# Patient Record
Sex: MALE | Race: WHITE | NOT HISPANIC OR LATINO | Employment: FULL TIME | URBAN - METROPOLITAN AREA
[De-identification: names, ages, dates, MRNs, and addresses within clinical notes are randomized per-mention and may not be internally consistent; named-entity substitution may affect disease eponyms.]

---

## 2017-01-01 PROBLEM — R73.9 BLOOD GLUCOSE ELEVATED: Status: ACTIVE | Noted: 2017-01-01

## 2017-11-07 ENCOUNTER — HOSPITAL ENCOUNTER (EMERGENCY)
Facility: HOSPITAL | Age: 62
Discharge: HOME | End: 2017-11-08
Attending: EMERGENCY MEDICINE
Payer: COMMERCIAL

## 2017-11-07 DIAGNOSIS — S39.92XA INJURY OF BACK DUE TO FALL, INITIAL ENCOUNTER: Primary | ICD-10-CM
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
## 2017-11-07 DIAGNOSIS — S22.32XA: ICD-10-CM

## 2017-11-08 ENCOUNTER — APPOINTMENT (EMERGENCY)
Dept: CT IMAGING | Facility: HOSPITAL | Age: 62
End: 2017-11-08
Payer: COMMERCIAL

## 2017-11-08 VITALS
SYSTOLIC BLOOD PRESSURE: 128 MMHG | OXYGEN SATURATION: 98 % | BODY MASS INDEX: 21.67 KG/M2 | TEMPERATURE: 97.5 F | WEIGHT: 160 LBS | RESPIRATION RATE: 16 BRPM | HEART RATE: 71 BPM | HEIGHT: 72 IN | DIASTOLIC BLOOD PRESSURE: 80 MMHG

## 2017-11-08 LAB
ANION GAP BLD CALC-SCNC: 16 MMOL/L (ref 4–13)
BILIRUB UR QL STRIP: NEGATIVE
BUN BLD-MCNC: 19 MG/DL (ref 5–25)
CA-I BLD-SCNC: 1.2 MMOL/L (ref 1.12–1.32)
CHLORIDE BLD-SCNC: 104 MMOL/L (ref 100–108)
CLARITY UR: CLEAR
COLOR UR: YELLOW
CREAT BLD-MCNC: 0.8 MG/DL (ref 0.6–1.3)
GFR SERPL CREATININE-BSD FRML MDRD: 97 ML/MIN/1.73SQ M
GLUCOSE SERPL-MCNC: 92 MG/DL (ref 65–140)
GLUCOSE UR STRIP-MCNC: NEGATIVE MG/DL
HCT VFR BLD CALC: 45 % (ref 36.5–49.3)
HGB BLDA-MCNC: 15.3 G/DL (ref 12–17)
HGB UR QL STRIP.AUTO: NEGATIVE
KETONES UR STRIP-MCNC: ABNORMAL MG/DL
LEUKOCYTE ESTERASE UR QL STRIP: NEGATIVE
NITRITE UR QL STRIP: NEGATIVE
PCO2 BLD: 25 MMOL/L (ref 21–32)
PH UR STRIP.AUTO: 5.5 [PH] (ref 4.5–8)
POTASSIUM BLD-SCNC: 4.5 MMOL/L (ref 3.5–5.3)
PROT UR STRIP-MCNC: NEGATIVE MG/DL
SODIUM BLD-SCNC: 140 MMOL/L (ref 136–145)
SP GR UR STRIP.AUTO: 1.02 (ref 1–1.03)
SPECIMEN SOURCE: ABNORMAL
UROBILINOGEN UR QL STRIP.AUTO: 0.2 E.U./DL

## 2017-11-08 PROCEDURE — 85014 HEMATOCRIT: CPT

## 2017-11-08 PROCEDURE — 74177 CT ABD & PELVIS W/CONTRAST: CPT

## 2017-11-08 PROCEDURE — 96374 THER/PROPH/DIAG INJ IV PUSH: CPT

## 2017-11-08 PROCEDURE — 96361 HYDRATE IV INFUSION ADD-ON: CPT

## 2017-11-08 PROCEDURE — 80047 BASIC METABLC PNL IONIZED CA: CPT

## 2017-11-08 PROCEDURE — 99284 EMERGENCY DEPT VISIT MOD MDM: CPT

## 2017-11-08 PROCEDURE — 81003 URINALYSIS AUTO W/O SCOPE: CPT | Performed by: EMERGENCY MEDICINE

## 2017-11-08 RX ORDER — NAPROXEN 500 MG/1
500 TABLET ORAL EVERY 12 HOURS PRN
Qty: 30 TABLET | Refills: 0 | Status: SHIPPED | OUTPATIENT
Start: 2017-11-08

## 2017-11-08 RX ORDER — ACETAMINOPHEN 325 MG/1
650 TABLET ORAL ONCE
Status: COMPLETED | OUTPATIENT
Start: 2017-11-08 | End: 2017-11-08

## 2017-11-08 RX ORDER — DIAZEPAM 5 MG/1
5 TABLET ORAL ONCE
Status: COMPLETED | OUTPATIENT
Start: 2017-11-08 | End: 2017-11-08

## 2017-11-08 RX ORDER — KETOROLAC TROMETHAMINE 30 MG/ML
15 INJECTION, SOLUTION INTRAMUSCULAR; INTRAVENOUS ONCE
Status: COMPLETED | OUTPATIENT
Start: 2017-11-08 | End: 2017-11-08

## 2017-11-08 RX ORDER — OXYCODONE HYDROCHLORIDE AND ACETAMINOPHEN 5; 325 MG/1; MG/1
TABLET ORAL
Qty: 20 TABLET | Refills: 0 | Status: SHIPPED | OUTPATIENT
Start: 2017-11-08

## 2017-11-08 RX ADMIN — ACETAMINOPHEN 650 MG: 325 TABLET ORAL at 00:17

## 2017-11-08 RX ADMIN — SODIUM CHLORIDE 500 ML: 0.9 INJECTION, SOLUTION INTRAVENOUS at 00:11

## 2017-11-08 RX ADMIN — KETOROLAC TROMETHAMINE 15 MG: 30 INJECTION, SOLUTION INTRAMUSCULAR at 01:10

## 2017-11-08 RX ADMIN — IOHEXOL 100 ML: 350 INJECTION, SOLUTION INTRAVENOUS at 01:07

## 2017-11-08 RX ADMIN — DIAZEPAM 5 MG: 5 TABLET ORAL at 00:17

## 2017-11-08 NOTE — DISCHARGE INSTRUCTIONS
Rib Fracture   WHAT YOU NEED TO KNOW:   A rib fracture is a crack or break in a rib bone  Rib fractures usually heal within 6 weeks  You should be able to return to normal activities before that time  Do not wrap anything around your body to try to splint your ribs  This can prevent you from taking deep breaths and increases your risk for pneumonia  DISCHARGE INSTRUCTIONS:   Call 911 for any of the following:   · You have trouble breathing  · You have new or increased pain  Seek care immediately if:   · Your pain does not get better, even after treatment  · You have a fever or a cough  Contact your healthcare provider if:   · You have questions or concerns about your condition or care  Medicines:   · NSAIDs  help decrease swelling and pain  NSAIDs are available without a doctor's order  Ask your healthcare provider which medicine is right for you  Ask how much to take and when to take it  Take as directed  NSAIDs can cause stomach bleeding and kidney problems if not taken correctly  · Prescription pain medicine  may be given  Ask your healthcare provider how to take this medicine safely  Some prescription pain medicines contain acetaminophen  Do not take other medicines that contain acetaminophen without talking to your healthcare provider  Too much acetaminophen may cause liver damage  Prescription pain medicine may cause constipation  Ask your healthcare provider how to prevent or treat constipation  · Take your medicine as directed  Contact your healthcare provider if you think your medicine is not helping or if you have side effects  Tell him or her if you are allergic to any medicine  Keep a list of the medicines, vitamins, and herbs you take  Include the amounts, and when and why you take them  Bring the list or the pill bottles to follow-up visits  Carry your medicine list with you in case of an emergency    Follow up with your healthcare provider as directed:  Write down your questions so you remember to ask them during your visits  Deep breathing:  Deep breathing will decrease your risk for pneumonia  Hug a pillow on the injured side while doing this exercise, to decrease pain  Take a deep breath and hold it for as long as possible  You should let the air out and then cough strongly  Deep breaths help open your airway  You may be given an incentive spirometer to help take deep breaths  Put the plastic piece in your mouth  Take a slow, deep breath  You should then let the air out and cough  Repeat these steps 10 times every hour  Rest:  Rest and limit activity to decrease swelling and pain, and allow your injury to heal  Avoid activities that may cause more pain or damage to your ribs such as, pulling, pushing, and lifting  As your pain decreases, begin movements slowly  Take short walks between rest periods  Ice:  Apply ice on the fractured area for 15 to 20 minutes every hour or as directed  Use an ice pack or put crushed ice in a plastic bag  Cover it with a towel  Ice helps prevent tissue damage and decreases swelling and pain  © 2017 [facility] Information is for End User's use only and may not be sold, redistributed or otherwise used for commercial purposes  All illustrations and images included in CareNotes® are the copyrighted property of A D A M , Inc  or [employer]  The above information is an  only  It is not intended as medical advice for individual conditions or treatments  Talk to your doctor, nurse or pharmacist before following any medical regimen to see if it is safe and effective for you

## 2017-11-08 NOTE — ED PROVIDER NOTES
History  Chief Complaint   Patient presents with   Floydene Nima     states "was cutting wood and fell back onto a log   pain in left lower back" Denies hitting head or LOC     Patient is a 72-year-old male with past medical history of hepatitis-C, presents to the emergency department complaining of left flank/mid back pain after he fell backwards while cutting wood  Patient states he was cutting wood on Friday and accidentally fell backwards and landed on another log  He states he struck his left side of the back/flank region  He denies hitting his head or loss of consciousness  Over the weekend he had minimal pain in that area however yesterday he coughed and started having much more severe pain in left flank region  He reports it is worse when he takes a deep breath, cough sneezes  Certain movements also exacerbate the pain  He denies any radiation of pain and he has yet to take any over-the-counter medications for pain  He denies any other symptoms or injuries  No fever, chills, headache, dizziness and near syncope, change in vision or hearing, tinnitus, neck pain or stiffness, URI symptoms, cough or hemoptysis, chest pain, palpitations, dyspnea, wheezing, abdominal pain or distention, nausea, vomiting, diarrhea, constipation, blood per rectum or melena, dysuria, frequency, hematuria, urinary or fecal retention or incontinence, skin rash or color change, extremity weakness or paresthesia or other focal neurologic deficits  Patient denies being on any blood thinners  History provided by:  Patient   used: No        Prior to Admission Medications   Prescriptions Last Dose Informant Patient Reported?  Taking?   predniSONE 10 mg tablet   No No   Sig: Take 3 tablets by mouth with meal tomorrow (1/2/17), 2 tablets by mouth with meal 1/3/17, and 1 tablet by mouth with meal 1/4/17      Facility-Administered Medications: None       Past Medical History:   Diagnosis Date    Hepatitis C No past surgical history on file  No family history on file  I have reviewed and agree with the history as documented  Social History   Substance Use Topics    Smoking status: Former Smoker     Packs/day: 1 00     Years: 30 00     Quit date: 12/3/2016    Smokeless tobacco: Former User     Quit date: 12/10/2016    Alcohol use 1 2 oz/week     2 Cans of beer per week        Review of Systems   Constitutional: Negative for chills and fever  HENT: Negative for congestion, ear pain, hearing loss, rhinorrhea, sore throat and tinnitus  Eyes: Negative for photophobia, pain and visual disturbance  Respiratory: Negative for cough, chest tightness, shortness of breath and wheezing  Cardiovascular: Negative for chest pain and palpitations  Gastrointestinal: Negative for abdominal pain, blood in stool, constipation, diarrhea, nausea and vomiting  Genitourinary: Positive for flank pain  Negative for decreased urine volume, difficulty urinating, dysuria, frequency and hematuria  Musculoskeletal: Positive for back pain  Negative for gait problem, neck pain and neck stiffness  Skin: Negative for color change, pallor, rash and wound  Allergic/Immunologic: Negative for immunocompromised state  Neurological: Negative for dizziness, syncope, weakness, light-headedness, numbness and headaches  Psychiatric/Behavioral: Negative for confusion and decreased concentration  All other systems reviewed and are negative        Physical Exam  ED Triage Vitals [11/07/17 2335]   Temperature Pulse Respirations Blood Pressure SpO2   97 5 °F (36 4 °C) 66 18 120/77 96 %      Temp Source Heart Rate Source Patient Position - Orthostatic VS BP Location FiO2 (%)   Oral Monitor Sitting Right arm --      Pain Score       9         Vitals:    11/07/17 2335 11/07/17 2336 11/08/17 0142   BP: 120/77  128/80   Pulse: 66  71   Resp: 18  16   Temp: 97 5 °F (36 4 °C)     TempSrc: Oral     SpO2: 96% 96% 98%   Weight: 72 6 kg (160 lb)     Height: 6' (1 829 m)           Physical Exam   Constitutional: He is oriented to person, place, and time  He appears well-developed and well-nourished  No distress  HENT:   Head: Normocephalic and atraumatic  Right Ear: External ear normal    Left Ear: External ear normal    Nose: Nose normal    Mouth/Throat: Oropharynx is clear and moist    Eyes: Conjunctivae and EOM are normal  Pupils are equal, round, and reactive to light  Neck: Normal range of motion  Neck supple  No JVD present  Cardiovascular: Normal rate, regular rhythm, normal heart sounds and intact distal pulses  Exam reveals no gallop and no friction rub  No murmur heard  Pulmonary/Chest: Effort normal and breath sounds normal  No respiratory distress  He has no wheezes  He has no rales  He exhibits no tenderness  Abdominal: Soft  Bowel sounds are normal  He exhibits no distension  There is no tenderness  There is no rebound and no guarding  Musculoskeletal: Normal range of motion  He exhibits tenderness  He exhibits no edema  No midline C/T/L-spine tenderness  No step-offs  No right-sided back tenderness  The left flank and left lower posterior ribs are tender to palpation  There is tenderness in the left thoracolumbar paravertebral muscle region  Lymphadenopathy:     He has no cervical adenopathy  Neurological: He is alert and oriented to person, place, and time  No cranial nerve deficit  No gross motor or sensory deficits  5/5 strength throughout  Skin: Skin is warm and dry  No rash noted  He is not diaphoretic  No erythema  No pallor  Psychiatric: He has a normal mood and affect  His behavior is normal    Nursing note and vitals reviewed        ED Medications  Medications   sodium chloride 0 9 % bolus 500 mL (0 mL Intravenous Stopped 11/8/17 0138)   acetaminophen (TYLENOL) tablet 650 mg (650 mg Oral Given 11/8/17 0017)   diazepam (VALIUM) tablet 5 mg (5 mg Oral Given 11/8/17 0017)   ketorolac (TORADOL) 30 mg/mL injection 15 mg (15 mg Intravenous Given 11/8/17 0110)   iohexol (OMNIPAQUE) 350 MG/ML injection (SINGLE-DOSE) 100 mL (100 mL Intravenous Given 11/8/17 0107)       Diagnostic Studies  Results Reviewed     Procedure Component Value Units Date/Time    UA w Reflex to Microscopic [[id number]]  (Abnormal) Collected:  11/08/17 0109    Lab Status:  Final result Specimen:  Urine from Urine, Clean Catch Updated:  11/08/17 0114     Color, UA Yellow     Clarity, UA Clear     Specific Gravity, UA 1 025     pH, UA 5 5     Leukocytes, UA Negative     Nitrite, UA Negative     Protein, UA Negative mg/dl      Glucose, UA Negative mg/dl      Ketones, UA Trace (A) mg/dl      Urobilinogen, UA 0 2 E U /dl      Bilirubin, UA Negative     Blood, UA Negative    POCT Chem 8+ [[id number]]  (Abnormal) Collected:  11/08/17 0023    Lab Status:  Final result Updated:  11/08/17 0028     SODIUM, I-STAT 140 mmol/l      Potassium, i-STAT 4 5 mmol/L      Chloride, istat 104 mmol/L      CO2, i-STAT 25 mmol/L      Anion Gap, Istat 16 (H) mmol/L      Calcium, Ionized i-STAT 1 20 mmol/L      BUN, I-STAT 19 mg/dl      Creatinine, i-STAT 0 8 mg/dl      eGFR 97 ml/min/1 73sq m      Glucose, i-STAT 92 mg/dl      Hct, i-STAT 45 %      Hgb, i-STAT 15 3 g/dl      Specimen Type VENOUS                 CT abdomen pelvis with contrast   ED Interpretation by Preet Mejia DO (11/08 0133)   [facility] report: "IMPRESSION:   Positive for a minimally displaced acute fracture of the posterior left 11th rib  CT recon only lumbar spine   ED Interpretation by Preet Mejia DO (11/08 0132)   [facility] report: IMPRESSION:   Normal lumbar spine CT  Procedures  Procedures       Phone Contacts  ED Phone Contact    ED Course  ED Course as of Nov 08 0223   Wed Nov 08, 2017 0106 [facility] report of CT abdomen/pelvis" "IMPRESSION:  Positive for a minimally displaced acute fracture of the posterior left 11th rib "  Updated patient about these findings    Will send home with pain medication as well as incentive spirometry  Recommended he follow up with his family doctor and return if his symptoms worsen, if he develops shortness of breath, productive cough or hemoptysis, vomiting, blood in urine or any other worrisome symptoms  MDM  Number of Diagnoses or Management Options  Closed traumatic minimally displaced fracture of one rib of left side, initial encounter:   Injury of back due to fall, initial encounter:   Diagnosis management comments: Left-sided back/flank pain after fall directly onto a log  Differential includes muscle contusion or strain/spasm, rib or vertebral fracture, kidney contusion or laceration  Will obtain a CT scan of the abdomen pelvis with lumbar spine recons  Will check urinalysis to ensure there is no microscopic hematuria as well as Chem i-STAT  Will give small IV fluid bolus, Tylenol and Valium  As long as CT scan does not show any intra-abdominal bleeding, will add Toradol for further pain relief  Amount and/or Complexity of Data Reviewed  Clinical lab tests: ordered and reviewed  Tests in the radiology section of CPT®: ordered and reviewed  Independent visualization of images, tracings, or specimens: yes    Patient Progress  Patient progress: improved (Patient reassessed at time of CT scan being read and states his pain is improved )    CritCare Time    Disposition  Final diagnoses:   Injury of back due to fall, initial encounter   Closed traumatic minimally displaced fracture of one rib of left side, initial encounter     Time reflects when diagnosis was documented in both MDM as applicable and the Disposition within this note     Time User Action Codes Description Comment    11/8/2017  1:34 AM David Guerrero Add [L27 33PE,  W19  XXXA] Injury of back due to fall, initial encounter     11/8/2017  1:35 AM Sobeida Lama [S22 32XA] Closed traumatic minimally displaced fracture of one rib of left side, initial encounter       ED Disposition     ED Disposition Condition Comment    Discharge  Ninfa Code discharge to home/self care  Condition at discharge: Stable        Follow-up Information     Follow up With Specialties Details Why Contact Info Additional Information    Rock Lee DO Family Medicine Schedule an appointment as soon as possible for a visit  [id number]  [address] [place] [address]  [phone]       [facility] Emergency Medicine Go to If symptoms worsen [address] [facility] [address] [phone] [place] [facility], [address] [place], [place], [address]        Discharge Medication List as of 11/8/2017  1:36 AM      START taking these medications    Details   naproxen (NAPROSYN) 500 mg tablet Take 1 tablet by mouth every 12 (twelve) hours as needed for mild pain or moderate pain, Starting Wed 11/8/2017, Print      oxyCODONE-acetaminophen (PERCOCET) 5-325 mg per tablet 1-2 tablets every 4-6 hours as needed for severe pain, Print         CONTINUE these medications which have NOT CHANGED    Details   predniSONE 10 mg tablet Take 3 tablets by mouth with meal tomorrow (1/2/17), 2 tablets by mouth with meal 1/3/17, and 1 tablet by mouth with meal 1/4/17, Print           No discharge procedures on file      ED Provider  Electronically Signed by           Krystyna Winslow DO  11/08/17 5326

## 2017-11-10 ENCOUNTER — HOSPITAL ENCOUNTER (EMERGENCY)
Facility: HOSPITAL | Age: 62
Discharge: HOME | End: 2017-11-10
Attending: EMERGENCY MEDICINE
Payer: COMMERCIAL

## 2017-11-10 VITALS
WEIGHT: 165 LBS | RESPIRATION RATE: 16 BRPM | BODY MASS INDEX: 22.35 KG/M2 | HEIGHT: 72 IN | DIASTOLIC BLOOD PRESSURE: 93 MMHG | TEMPERATURE: 97.6 F | HEART RATE: 74 BPM | OXYGEN SATURATION: 99 % | SYSTOLIC BLOOD PRESSURE: 151 MMHG

## 2017-11-10 DIAGNOSIS — S36.039A LACERATION OF SPLEEN, INITIAL ENCOUNTER: Primary | ICD-10-CM

## 2017-11-10 LAB
ABO GROUP BLD: NORMAL
ALBUMIN SERPL BCP-MCNC: 3.9 G/DL (ref 3.5–5)
ALP SERPL-CCNC: 81 U/L (ref 46–116)
ALT SERPL W P-5'-P-CCNC: 32 U/L (ref 12–78)
ANION GAP SERPL CALCULATED.3IONS-SCNC: 5 MMOL/L (ref 4–13)
APTT PPP: 28 SECONDS (ref 23–35)
AST SERPL W P-5'-P-CCNC: 24 U/L (ref 5–45)
BASOPHILS # BLD AUTO: 0.04 THOUSANDS/ΜL (ref 0–0.1)
BASOPHILS NFR BLD AUTO: 0 % (ref 0–1)
BILIRUB SERPL-MCNC: 0.97 MG/DL (ref 0.2–1)
BLD GP AB SCN SERPL QL: NEGATIVE
BUN SERPL-MCNC: 14 MG/DL (ref 5–25)
CALCIUM SERPL-MCNC: 8.9 MG/DL (ref 8.3–10.1)
CHLORIDE SERPL-SCNC: 104 MMOL/L (ref 100–108)
CO2 SERPL-SCNC: 28 MMOL/L (ref 21–32)
CREAT SERPL-MCNC: 0.68 MG/DL (ref 0.6–1.3)
EOSINOPHIL # BLD AUTO: 0.33 THOUSAND/ΜL (ref 0–0.61)
EOSINOPHIL NFR BLD AUTO: 3 % (ref 0–6)
ERYTHROCYTE [DISTWIDTH] IN BLOOD BY AUTOMATED COUNT: 13.7 % (ref 11.6–15.1)
GFR SERPL CREATININE-BSD FRML MDRD: 104 ML/MIN/1.73SQ M
GLUCOSE SERPL-MCNC: 85 MG/DL (ref 65–140)
HCT VFR BLD AUTO: 45.1 % (ref 36.5–49.3)
HGB BLD-MCNC: 15.8 G/DL (ref 12–17)
INR PPP: 0.96 (ref 0.86–1.16)
LYMPHOCYTES # BLD AUTO: 2.17 THOUSANDS/ΜL (ref 0.6–4.47)
LYMPHOCYTES NFR BLD AUTO: 17 % (ref 14–44)
MCH RBC QN AUTO: 33.5 PG (ref 26.8–34.3)
MCHC RBC AUTO-ENTMCNC: 35 G/DL (ref 31.4–37.4)
MCV RBC AUTO: 96 FL (ref 82–98)
MONOCYTES # BLD AUTO: 1.11 THOUSAND/ΜL (ref 0.17–1.22)
MONOCYTES NFR BLD AUTO: 9 % (ref 4–12)
NEUTROPHILS # BLD AUTO: 9.06 THOUSANDS/ΜL (ref 1.85–7.62)
NEUTS SEG NFR BLD AUTO: 71 % (ref 43–75)
NRBC BLD AUTO-RTO: 0 /100 WBCS
PLATELET # BLD AUTO: 236 THOUSANDS/UL (ref 149–390)
PMV BLD AUTO: 11.8 FL (ref 8.9–12.7)
POTASSIUM SERPL-SCNC: 3.9 MMOL/L (ref 3.5–5.3)
PROT SERPL-MCNC: 7.4 G/DL (ref 6.4–8.2)
PROTHROMBIN TIME: 12.8 SECONDS (ref 12.1–14.4)
RBC # BLD AUTO: 4.72 MILLION/UL (ref 3.88–5.62)
RH BLD: POSITIVE
SODIUM SERPL-SCNC: 137 MMOL/L (ref 136–145)
SPECIMEN EXPIRATION DATE: NORMAL
WBC # BLD AUTO: 12.76 THOUSAND/UL (ref 4.31–10.16)

## 2017-11-10 PROCEDURE — 85730 THROMBOPLASTIN TIME PARTIAL: CPT | Performed by: EMERGENCY MEDICINE

## 2017-11-10 PROCEDURE — 86901 BLOOD TYPING SEROLOGIC RH(D): CPT | Performed by: EMERGENCY MEDICINE

## 2017-11-10 PROCEDURE — 85610 PROTHROMBIN TIME: CPT | Performed by: EMERGENCY MEDICINE

## 2017-11-10 PROCEDURE — 36415 COLL VENOUS BLD VENIPUNCTURE: CPT | Performed by: EMERGENCY MEDICINE

## 2017-11-10 PROCEDURE — 86850 RBC ANTIBODY SCREEN: CPT | Performed by: EMERGENCY MEDICINE

## 2017-11-10 PROCEDURE — 99284 EMERGENCY DEPT VISIT MOD MDM: CPT

## 2017-11-10 PROCEDURE — 86900 BLOOD TYPING SEROLOGIC ABO: CPT | Performed by: EMERGENCY MEDICINE

## 2017-11-10 PROCEDURE — 80053 COMPREHEN METABOLIC PANEL: CPT | Performed by: EMERGENCY MEDICINE

## 2017-11-10 PROCEDURE — 85025 COMPLETE CBC W/AUTO DIFF WBC: CPT | Performed by: EMERGENCY MEDICINE

## 2017-11-10 NOTE — ED NOTES
Trauma at bedside     Pavithra Alfaro, [address] [facility] [place] [facility]  11/10/17 6992

## 2017-11-10 NOTE — ED NOTES
11/10/2017 1208 patient's wife returned phone messages left for him  I informed her of the CT scan findings of krys splenic hemorrhage grade 1 or 2 noted on the over read of the CT scan of the abdomen and pelvis  BARBARA bynum pa-c had discussed case with trauma service Dr Anna Conner after receiving phone call regarding abnormal ct scan results and had been instructed to inform patient to return to ed for further evaluation due to symptoms secondary to trauma  I informed patient's wife to have him return to [facility] ED for evaluation of perisplenic hemorrhage  She verbalized understanding of instructions  Called to inform [facility] ED regarding possible patient incoming        Mariel Vee PA-C  11/10/17 2028

## 2017-11-10 NOTE — H&P
H&P Exam - Trauma   Roosevelt Perez 63 y o  male MRN: [id number]  Unit/Bed#: ED 21 Encounter: [id number]    Assessment/Plan   Trauma Alert: Other ED consult  Model of Arrival: Self  Trauma Team: Attending Florencio Medeiros  Resident: Corinne Meier  Consultants: None    Trauma Active Problems:   Grade 1 or 2 splenic laceration with perisplenic hemorrhage   Nondisplaced left 11th rib fracture      Trauma Plan:   - patient stable to be discharged home  - hgb stable went from 13>15  - advised to return if any worsening of symptoms     Chief Complaint: I feel great     History of Present Illness   HPI:  Roosevelt Perez is a 63 y o  male who presented as as trauma consult regarding a missed CT read regarding splenic laceration  Patient states he was splitting wood about 1 week ago when he fell backward onto a log  He had no pain at the time and slowly developed  He went to the ED 11/8 and was evaluated with CT abdomen and pelvis with was read by [facility] with only 1 rib fracture  The morning radiologist called regarding missed grade 1 or 2 with splenic laceration  Patient was called to return to the ED and came in today (2days later)  Currently patient states his pain is completely gone and he is back at work  He is not taking any medications for pain  States no chest pain, shortness of breath, abdominal pain, weakness, numbness, back pain  He would like to go home  Repeat labs showed improved hgb to 15 8 and normal coags  Patient not on blood thinners     Mechanism:Fall    Review of Systems  REVIEW OF SYSTEMS  Constitutional:  Denies fever or chills   Eyes:  Denies change in visual acuity   HENT:  Denies nasal congestion or sore throat   Respiratory:  Denies cough or shortness of breath   Cardiovascular:  Denies chest pain or edema   GI:  Denies abdominal pain, nausea, vomiting, bloody stools or diarrhea   :  Denies dysuria   Musculoskeletal:  Denies back pain or joint pain   Integument:  Denies rash   Neurologic:  Denies headache, focal weakness or sensory changes   Endocrine:  Denies polyuria or polydipsia   Lymphatic:  Denies swollen glands   Psychiatric:  Denies depression or anxiety     Historical Information     Past Medical History:   Diagnosis Date    Hepatitis C      History reviewed  No pertinent surgical history  Social History   History   Alcohol Use    1 2 oz/week    2 Cans of beer per week     History   Drug Use No     History   Smoking Status    Former Smoker    Packs/day: 1 00    Years: 30 00    Quit date: 12/3/2016   Smokeless Tobacco    Former User    Quit date: 12/10/2016     Immunization History   Administered Date(s) Administered    Influenza, Quadrivalent 01/01/2017     Last Tetanus: unsure  Family History: Non-contributory        Meds/Allergies   all current active meds have been reviewed    No Known Allergies      PHYSICAL EXAM  Constitutional:  Well developed, well nourished, no distress   distress, non-toxic appearance   Eyes:  PERRL, conjunctiva normal   HENT:  Atraumatic, external ears normal, nose normal, oropharynx moist, no pharyngeal exudates  Neck- normal range of motion, no tenderness, supple   Respiratory: no wheezes, no rhonchi and normal symmetric air entry normal breath sounds bilaterally,   Cardiovascular:  normal rate, normal rhythm, no murmurs, no gallops, no rubs   GI:  nontender, no rebound or guarding  , no organomegaly, no mass,    Musculoskeletal:  no CVA tenderness, no tenderness, no deformities   Back- no tenderness  Integument:  Well hydrated, no rash   Lymphatic:  No lymphadenopathy noted   Neurologic:  Alert & oriented x 3, CN 2-12 normal, normal motor function, normal sensory function, no focal deficits noted   Psychiatric:  Speech and behavior appropriate       Objective   Vitals:   First set: Temperature: 97 6 °F (36 4 °C) (11/10/17 1404)  Pulse: 86 (11/10/17 1404)  Respirations: 16 (11/10/17 1404)  Blood Pressure: 145/86 (11/10/17 1404)    Primary Survey:   (A) Airway: intact  (B) Breathing: cta b/l  (C) Circulation: Pulses:   normal  (D) Disabliity:  GCS Total:  15  (E) Expose:  Completed    Secondary Survey: (Click on Physical Exam tab above)  Physical Exam    Invasive Devices     Peripheral Intravenous Line            Peripheral IV 11/10/17 Left Antecubital less than 1 day                Lab Results:   BMP/CMP:   Lab Results   Component Value Date     11/10/2017    K 3 9 11/10/2017     11/10/2017    CO2 28 11/10/2017    ANIONGAP 5 11/10/2017    BUN 14 11/10/2017    CREATININE 0 68 11/10/2017    GLUCOSE 85 11/10/2017    CALCIUM 8 9 11/10/2017    AST 24 11/10/2017    ALT 32 11/10/2017    ALKPHOS 81 11/10/2017    PROT 7 4 11/10/2017    ALBUMIN 3 9 11/10/2017    BILITOT 0 97 11/10/2017    EGFR 104 11/10/2017    and CBC:   Lab Results   Component Value Date    WBC 12 76 (H) 11/10/2017    HGB 15 8 11/10/2017    HCT 45 1 11/10/2017    MCV 96 11/10/2017     11/10/2017    MCH 33 5 11/10/2017    MCHC 35 0 11/10/2017    RDW 13 7 11/10/2017    MPV 11 8 11/10/2017    NRBC 0 11/10/2017     Imaging/EKG Studies:   Other Studies: 11/8 CT AP:    Splenic lower pole injury likely grade 1 or grade 2 with minimal perisplenic hemorrhage maximum thickness 0 6 cm      Acute nondisplaced fracture left 11th posterolateral rib      There is a significant additional finding or different interpretation from the [facility] preliminary report which was provided shortly after completion of the exam  The finding of grade 1 or grade 2 injury lower pole of the spleen with minimal   pericapsular hemorrhage  may alter immediate patient management, therefore we will now contact referring physicians for direct verbal discussion        Code Status: Prior  Advance Directive and Living Will:      Power of :    POLST:

## 2017-11-10 NOTE — ED ATTENDING ATTESTATION
Montana Humphreys DO, saw and evaluated the patient  I have discussed the patient with the resident/non-physician practitioner and agree with the resident's/non-physician practitioner's findings, Plan of Care, and MDM as documented in the resident's/non-physician practitioner's note, except where noted  All available labs and Radiology studies were reviewed  At this point I agree with the current assessment done in the Emergency Department  I have conducted an independent evaluation of this patient including a focused history and a physical exam     ED Note - Donna Santiago 63 y o  male MRN: [id number]  Unit/Bed#: ED 21 Encounter: [id number]    History of Present Illness   HPI  Donna Santiago is a 63 y o  male who presents for evaluation after call back  s/p fall  He was "splitting wood" on 11/8/17 and fell backwards onto his left back  ED evaluation at [facility] completed and CT showed left 11th rib fx  The addendum to the initial interpretation showed "The finding of grade 1 or grade 2 injury lower pole of the spleen with minimal pericapsular hemorrhage"  Patient was notified by hospital staff and was refered to the [facility] for evaluation by the trauma service  Currently patient has no acute complaints  REVIEW OF SYSTEMS  See HPI for further details  12 systems reviewed and otherwise negative except as noted  Historical Information     PAST MEDICAL HISTORY  Past Medical History:   Diagnosis Date    Hepatitis C        FAMILY HISTORY  History reviewed  No pertinent family history      SOCIAL HISTORY  Social History     Social History    Marital status: /Civil Union     Spouse name: N/A    Number of children: N/A    Years of education: N/A     Social History Main Topics    Smoking status: Former Smoker     Packs/day: 1 00     Years: 30 00     Quit date: 12/3/2016    Smokeless tobacco: Former User     Quit date: 12/10/2016    Alcohol use 1 2 oz/week     2 Cans of beer per week    Drug use: No    Sexual activity: Not Asked     Other Topics Concern    None     Social History Narrative    None       SURGICAL HISTORY  History reviewed  No pertinent surgical history  Meds/Allergies     CURRENT MEDICATIONS  No current facility-administered medications for this encounter  Current Outpatient Prescriptions:     naproxen (NAPROSYN) 500 mg tablet, Take 1 tablet by mouth every 12 (twelve) hours as needed for mild pain or moderate pain, Disp: 30 tablet, Rfl: 0    oxyCODONE-acetaminophen (PERCOCET) 5-325 mg per tablet, 1-2 tablets every 4-6 hours as needed for severe pain, Disp: 20 tablet, Rfl: 0    (Not in a hospital admission)    ALLERGIES  No Known Allergies  Objective     PHYSICAL EXAM    VITAL SIGNS: Blood pressure 118/72, pulse 75, temperature 97 6 °F (36 4 °C), temperature source Tympanic, resp  rate 18, height 6' (1 829 m), weight 74 8 kg (165 lb), SpO2 97 %  Constitutional:  Well developed, well nourished, no acute distress, non-toxic appearance   Eyes:  PERRL, EOMI, conjunctivae pink   HENT:  Normocephalic/Atraumatic, no rhinorrhea, mucous membranes moist  Neck: normal range of motion, no tenderness, supple   Respiratory:  No respiratory distress, normal breath sounds, no accessory muscle use  Cardiovascular:  Normal rate, normal rhythm    GI:  Soft, non-tender, non-distended  :  No CVAT, no flank ecchymosis   Musculoskeletal:  No swelling or edema, no tenderness, no deformities  Integument:  Pink, warm, dry, Well hydrated, no rash, no erythema, no bullae   Lymphatic:  No cervical/ tonsillar/ submandibular lymphadenopathy noted   Neurologic:  Awake, Alert & oriented x 3, CN 2-12 intact, no focal neurological deficits  Psychiatric:  Speech and behavior appropriate       ED COURSE and MDM:    Assessment/Plan   Assessment:  62 yo male presents for evaluation after call back  s/p fall  He was "splitting wood" on 11/8/17 and fell backwards onto his left back   ED evaluation at [facility] completed and CT showed left 11th rib fx  The addendum to the initial interpretation showed "The finding of grade 1 or grade 2 injury lower pole of the spleen with minimal pericapsular hemorrhage"  Patient was notified by hospital staff and was refered to the [facility] for evaluation by the trauma service  Currently patient has no acute complaints  Plan:  Symptom management, Trauma evaluation  Disposition as appropriate  Portions of the record may have been created with voice recognition software  Occasional wrong word or "sound a like" substitutions may have occurred due to the inherent limitations of voice recognition software       ED Provider  Electronically Signed by

## 2017-11-10 NOTE — DISCHARGE INSTRUCTIONS
Liver or Spleen Laceration   WHAT YOU NEED TO KNOW:   A liver or spleen laceration is a cut, tear, or puncture in your liver or spleen  These injuries may or may not happen at the same time  A liver or spleen laceration may be caused by a sports injury, car accident, fall, gunshot, or stab wound  DISCHARGE INSTRUCTIONS:   Call 911 for any of the following:   · You feel lightheaded, short of breath, and have chest pain  · You cough up blood  · You have trouble breathing  Seek care immediately if:   · Your arm or leg feels warm, tender, and painful  It may look swollen and red  · Your skin or eyes are yellow  · Your abdomen is larger than normal, firm, and painful  · You look pale or feel weak, dizzy, or faint  · You have new or worsening pain  · Blood soaks through your bandage  · Your wound comes apart  · You are vomiting blood or material that looks like coffee grounds  · You have blood in your bowel movements  · You have pain in your left shoulder  Contact your healthcare provider if:   · You have a fever  · Your wound is red, swollen, or draining pus  · Your pain does not get better after you take your pain medicine  · You have nausea or are vomiting  · You have questions or concerns about your condition or care  Medicines: You may need any of the following:  · Prescription pain medicine  may be given  Ask your healthcare provider how to take this medicine safely  · Antibiotics  help treat or prevent a bacterial infection  · Take your medicine as directed  Contact your healthcare provider if you think your medicine is not helping or if you have side effects  Tell him or her if you are allergic to any medicine  Keep a list of the medicines, vitamins, and herbs you take  Include the amounts, and when and why you take them  Bring the list or the pill bottles to follow-up visits   Carry your medicine list with you in case of an emergency  Activity:  Take a short walk 2 to 3 times each day  This may prevent blood clots and help you heal faster  Do not play contact sports such as football or soccer  These activities can increase your risk for bleeding from your liver or spleen  Do not drive until your healthcare provider says it is okay  Ask your healthcare provider when you can return to your regular activities and work or school  Do not take aspirin or NSAIDs:  These medicines may increase your risk for bleeding  Care for your wound as directed:  Do not remove your bandage for 24 hours or as directed  When your healthcare provider says you can shower, carefully wash around the wound with soap and water  It is okay to let soap and water gently run over your wound  Do not scrub your wound  Dry the area and put on new, clean bandages as directed  Change your bandages when they get wet or dirty  Check your wound every day for signs of infection, such as redness, swelling, or pus  Do not take a bath or swim until your healthcare provider says it is okay  These actions may cause an infection  Follow up with your healthcare provider as directed:  Write down your questions so you remember to ask them during your visits  © 2017 [employer]  Information is for End User's use only and may not be sold, redistributed or otherwise used for commercial purposes  All illustrations and images included in CareNotes® are the copyrighted property of [employer]  or [employer]  The above information is an  only  It is not intended as medical advice for individual conditions or treatments  Talk to your doctor, nurse or pharmacist before following any medical regimen to see if it is safe and effective for you

## 2017-11-10 NOTE — ED PROVIDER NOTES
History  Chief Complaint   Patient presents with    Fall     Pt fell while splitting wood on Friday seen at [facility] and diagnosed with fractured rib  Wife states received call from campus and told to come in for further evaluation of bleeding from spleen     HPI:    Patient is a 64 y/o male presenting to the ER s/p fall  Patient was "splitting wood" on 17 and fell backwards onto his left back  Patient was transported to the [facility] and had ct scan of the abd/pelvis as well as x ray  Virtual radiology demonstrated left 11th rib fx  The following day [facility] radiology noted "The finding of grade 1 or grade 2 injury lower pole of the spleen with minimal pericapsular hemorrhage"  Patient was notified by hospital staff and was refered to the [facility] for evaluation by the trauma service  Currently patient has no acute complaints  Pmhx:  hepatitis C      Pshx:  None       Assessment:  -L rib 11 fx  -Splenic lac     Plan:  -Will type and screen   -H/H  -Trauma to evaluate  History provided by:  Patient   used: No        Prior to Admission Medications   Prescriptions Last Dose Informant Patient Reported? Taking?   naproxen (NAPROSYN) 500 mg tablet   No Yes   Sig: Take 1 tablet by mouth every 12 (twelve) hours as needed for mild pain or moderate pain   oxyCODONE-acetaminophen (PERCOCET) 5-325 mg per tablet   No Yes   Si-2 tablets every 4-6 hours as needed for severe pain      Facility-Administered Medications: None       Past Medical History:   Diagnosis Date    Hepatitis C        History reviewed  No pertinent surgical history  History reviewed  No pertinent family history  I have reviewed and agree with the history as documented      Social History   Substance Use Topics    Smoking status: Former Smoker     Packs/day: 1 00     Years: 30 00     Quit date: 12/3/2016    Smokeless tobacco: Former User     Quit date: 12/10/2016    Alcohol use 1 2 oz/week 2 Cans of beer per week        Review of Systems   Constitutional: Negative for activity change, appetite change, chills, fatigue and fever  HENT: Negative  Eyes: Negative  Respiratory: Negative  Cardiovascular: Negative  Gastrointestinal: Negative for abdominal distention, abdominal pain, blood in stool, constipation, diarrhea, nausea and vomiting  Endocrine: Negative  Genitourinary: Negative for decreased urine volume, difficulty urinating, dysuria, enuresis, flank pain, frequency, hematuria, penile swelling, scrotal swelling, testicular pain and urgency  Musculoskeletal: Negative  Skin: Negative  Allergic/Immunologic: Negative  Neurological: Negative  Hematological: Negative  Psychiatric/Behavioral: Negative  All other systems reviewed and are negative  Physical Exam  ED Triage Vitals   Temperature Pulse Respirations Blood Pressure SpO2   11/10/17 1404 11/10/17 1404 11/10/17 1404 11/10/17 1404 11/10/17 1404   97 6 °F (36 4 °C) 86 16 145/86 98 %      Temp Source Heart Rate Source Patient Position - Orthostatic VS BP Location FiO2 (%)   11/10/17 1404 11/10/17 1459 11/10/17 1459 11/10/17 1459 --   Tympanic Monitor Sitting Right arm       Pain Score       11/10/17 1404       1           Orthostatic Vital Signs  Vitals:    11/10/17 1459 11/10/17 1529 11/10/17 1630 11/10/17 1700   BP: 114/80 118/72 129/84 138/79   Pulse: 77 75 62 68   Patient Position - Orthostatic VS: Sitting Sitting Sitting Sitting       Physical Exam   Constitutional: He is oriented to person, place, and time  He appears well-developed and well-nourished  HENT:   Head: Normocephalic and atraumatic  Right Ear: External ear normal    Left Ear: External ear normal    Nose: Nose normal    Mouth/Throat: Oropharynx is clear and moist    Eyes: Conjunctivae and EOM are normal  Pupils are equal, round, and reactive to light  Neck: Normal range of motion  Neck supple  No JVD present   No tracheal deviation present  No thyromegaly present  Cardiovascular: Normal rate, regular rhythm, normal heart sounds and intact distal pulses  Exam reveals no gallop and no friction rub  No murmur heard  Pulmonary/Chest: Effort normal and breath sounds normal  No stridor  No respiratory distress  He has no wheezes  He has no rales  He exhibits no tenderness  Abdominal: Soft  Bowel sounds are normal  He exhibits no distension and no mass  There is no tenderness  There is no rebound and no guarding  No hernia  Musculoskeletal: Normal range of motion  He exhibits no edema, tenderness or deformity  Lymphadenopathy:     He has no cervical adenopathy  Neurological: He is alert and oriented to person, place, and time  He has normal reflexes  He displays normal reflexes  No cranial nerve deficit  He exhibits normal muscle tone  Coordination normal    Skin: Skin is warm  No rash noted  No erythema  No pallor  Psychiatric: He has a normal mood and affect  His behavior is normal  Judgment and thought content normal    Nursing note and vitals reviewed  ED Medications  Medications - No data to display    Diagnostic Studies  Results Reviewed     Procedure Component Value Units Date/Time    Protime-INR [[id number]]  (Normal) Collected:  11/10/17 1527    Lab Status:  Final result Specimen:  Blood from Arm, Left Updated:  11/10/17 1632     Protime 12 8 seconds      INR 0 96    APTT [[id number]]  (Normal) Collected:  11/10/17 1527    Lab Status:  Final result Specimen:  Blood from Arm, Left Updated:  11/10/17 1632     PTT 28 seconds     Narrative:          Therapeutic Heparin Range = 60-90 seconds    Comprehensive metabolic panel [[id number]] Collected:  11/10/17 1527    Lab Status:  Final result Specimen:  Blood from Arm, Left Updated:  11/10/17 1603     Sodium 137 mmol/L      Potassium 3 9 mmol/L      Chloride 104 mmol/L      CO2 28 mmol/L      Anion Gap 5 mmol/L      BUN 14 mg/dL      Creatinine 0 68 mg/dL      Glucose 85 mg/dL Calcium 8 9 mg/dL      AST 24 U/L      ALT 32 U/L      Alkaline Phosphatase 81 U/L      Total Protein 7 4 g/dL      Albumin 3 9 g/dL      Total Bilirubin 0 97 mg/dL      eGFR 104 ml/min/1 73sq m     Narrative:         National Kidney Disease Education Program recommendations are as follows:  GFR calculation is accurate only with a steady state creatinine  Chronic Kidney disease less than 60 ml/min/1 73 sq  meters  Kidney failure less than 15 ml/min/1 73 sq  meters  CBC and differential [[id number]]  (Abnormal) Collected:  11/10/17 1527    Lab Status:  Final result Specimen:  Blood from Arm, Left Updated:  11/10/17 1542     WBC 12 76 (H) Thousand/uL      RBC 4 72 Million/uL      Hemoglobin 15 8 g/dL      Hematocrit 45 1 %      MCV 96 fL      MCH 33 5 pg      MCHC 35 0 g/dL      RDW 13 7 %      MPV 11 8 fL      Platelets 920 Thousands/uL      nRBC 0 /100 WBCs      Neutrophils Relative 71 %      Lymphocytes Relative 17 %      Monocytes Relative 9 %      Eosinophils Relative 3 %      Basophils Relative 0 %      Neutrophils Absolute 9 06 (H) Thousands/µL      Lymphocytes Absolute 2 17 Thousands/µL      Monocytes Absolute 1 11 Thousand/µL      Eosinophils Absolute 0 33 Thousand/µL      Basophils Absolute 0 04 Thousands/µL                  No orders to display         Procedures  Procedures      Phone Consults  ED Phone Contact    ED Course  ED Course as of Nov 10 1739   Fri Nov 10, 2017   1520 Spoke with Dr Garg Courser of the trauma service  Stated, should obtain basic screening labs, type and screen, would be down to evaluate patient in the ER     1737 Trauma service inform me patient could be discharged at this time  Will discharge                                  MDM  Number of Diagnoses or Management Options  Laceration of spleen, initial encounter: new and requires workup     Amount and/or Complexity of Data Reviewed  Clinical lab tests: ordered and reviewed  Tests in the radiology section of CPT®: ordered and reviewed  Tests in the medicine section of CPT®: reviewed and ordered  Decide to obtain previous medical records or to obtain history from someone other than the patient: yes  Independent visualization of images, tracings, or specimens: yes    Patient Progress  Patient progress: stable    CritCare Time    Disposition  Final diagnoses:   Laceration of spleen, initial encounter     Time reflects when diagnosis was documented in both MDM as applicable and the Disposition within this note     Time User Action Codes Description Comment    11/10/2017  5:38 PM Ashly Diaz Add [[id number]] Laceration of spleen, initial encounter       ED Disposition     ED Disposition Condition Comment    Discharge  Randy Gill discharge to home/self care  Condition at discharge: Good        Follow-up Information     Follow up With Specialties Details Why Contact Info    Jolanta Su DO Family Medicine Schedule an appointment as soon as possible for a visit  [id number]  [address] [place], [place] [id number]  [phone]          Patient's Medications   Discharge Prescriptions    No medications on file     No discharge procedures on file  ED Provider  Attending physically available and evaluated Randy Gill I managed the patient along with the ED Attending      Electronically Signed by         Belinda Donohue DO  Resident  11/10/17 2564

## 2017-11-14 ENCOUNTER — TRANSCRIBE ORDERS (OUTPATIENT)
Dept: ADMINISTRATIVE | Facility: HOSPITAL | Age: 62
End: 2017-11-14

## 2017-11-14 DIAGNOSIS — S36.039A LACERATION OF SPLEEN, INITIAL ENCOUNTER: Primary | ICD-10-CM

## 2017-11-17 ENCOUNTER — HOSPITAL ENCOUNTER (OUTPATIENT)
Dept: RADIOLOGY | Facility: MEDICAL CENTER | Age: 62
Discharge: HOME | End: 2017-11-17
Payer: COMMERCIAL

## 2017-11-17 DIAGNOSIS — S36.039A LACERATION OF SPLEEN, INITIAL ENCOUNTER: ICD-10-CM

## 2017-11-17 PROCEDURE — 74160 CT ABDOMEN W/CONTRAST: CPT

## 2017-11-17 RX ADMIN — IOHEXOL 100 ML: 350 INJECTION, SOLUTION INTRAVENOUS at 14:55
